# Patient Record
Sex: FEMALE | Race: WHITE | Employment: UNEMPLOYED | URBAN - METROPOLITAN AREA
[De-identification: names, ages, dates, MRNs, and addresses within clinical notes are randomized per-mention and may not be internally consistent; named-entity substitution may affect disease eponyms.]

---

## 2024-01-25 ENCOUNTER — INITIAL CONSULT (OUTPATIENT)
Dept: PHYSICAL MEDICINE AND REHAB | Age: 64
End: 2024-01-25
Payer: COMMERCIAL

## 2024-01-25 VITALS — DIASTOLIC BLOOD PRESSURE: 70 MMHG | SYSTOLIC BLOOD PRESSURE: 120 MMHG | HEART RATE: 97 BPM | TEMPERATURE: 97.6 F

## 2024-01-25 DIAGNOSIS — M79.7 FIBROMYALGIA: ICD-10-CM

## 2024-01-25 DIAGNOSIS — R25.2 SPASTICITY AS LATE EFFECT OF CEREBROVASCULAR ACCIDENT (CVA): ICD-10-CM

## 2024-01-25 DIAGNOSIS — I69.320 APHASIA AS LATE EFFECT OF CEREBROVASCULAR ACCIDENT (CVA): ICD-10-CM

## 2024-01-25 DIAGNOSIS — I69.398 SPASTICITY AS LATE EFFECT OF CEREBROVASCULAR ACCIDENT (CVA): ICD-10-CM

## 2024-01-25 DIAGNOSIS — I69.351 HEMIPLEGIA AND HEMIPARESIS FOLLOWING CEREBRAL INFARCTION AFFECTING RIGHT DOMINANT SIDE (HCC): Primary | ICD-10-CM

## 2024-01-25 PROCEDURE — 99205 OFFICE O/P NEW HI 60 MIN: CPT | Performed by: PHYSICAL MEDICINE & REHABILITATION

## 2024-01-25 RX ORDER — ESCITALOPRAM OXALATE 10 MG/1
10 TABLET ORAL DAILY
COMMUNITY
End: 2024-01-25 | Stop reason: ALTCHOICE

## 2024-01-25 RX ORDER — TRIAMCINOLONE ACETONIDE 1 MG/G
CREAM TOPICAL
COMMUNITY

## 2024-01-25 RX ORDER — ASPIRIN 81 MG/1
TABLET, CHEWABLE ORAL
COMMUNITY
Start: 2022-03-05

## 2024-01-25 RX ORDER — ATORVASTATIN CALCIUM 20 MG/1
20 TABLET, FILM COATED ORAL
COMMUNITY

## 2024-01-25 RX ORDER — OMEPRAZOLE 20 MG/1
20 CAPSULE, DELAYED RELEASE ORAL DAILY
COMMUNITY
End: 2024-01-25

## 2024-01-25 RX ORDER — LISINOPRIL 5 MG/1
5 TABLET ORAL DAILY
COMMUNITY
End: 2024-01-25

## 2024-01-25 RX ORDER — ESCITALOPRAM OXALATE 10 MG/1
TABLET ORAL
COMMUNITY
Start: 2022-03-05 | End: 2024-01-25

## 2024-01-25 RX ORDER — GABAPENTIN 300 MG/1
600 CAPSULE ORAL 3 TIMES DAILY
COMMUNITY

## 2024-01-25 RX ORDER — OMEPRAZOLE 20 MG/1
20 TABLET, DELAYED RELEASE ORAL DAILY
COMMUNITY

## 2024-01-25 RX ORDER — DULOXETIN HYDROCHLORIDE 30 MG/1
30 CAPSULE, DELAYED RELEASE ORAL DAILY
Qty: 30 CAPSULE | Refills: 3 | Status: SHIPPED | OUTPATIENT
Start: 2024-01-25

## 2024-01-25 RX ORDER — FLUTICASONE PROPIONATE 50 MCG
1 SPRAY, SUSPENSION (ML) NASAL DAILY
COMMUNITY
Start: 2022-08-21

## 2024-01-25 RX ORDER — BACLOFEN 10 MG/1
15 TABLET ORAL 3 TIMES DAILY
Qty: 135 TABLET | Refills: 2 | Status: SHIPPED | OUTPATIENT
Start: 2024-01-25

## 2024-01-25 RX ORDER — BACLOFEN 10 MG/1
10 TABLET ORAL 3 TIMES DAILY
COMMUNITY
End: 2024-01-25 | Stop reason: SDUPTHER

## 2024-01-25 RX ORDER — LISINOPRIL 5 MG/1
TABLET ORAL
COMMUNITY
Start: 2022-03-05 | End: 2024-01-25

## 2024-01-25 RX ORDER — ACETAMINOPHEN 500 MG
500-1000 TABLET ORAL EVERY 6 HOURS PRN
COMMUNITY

## 2024-01-25 NOTE — PROGRESS NOTES
[facility], [facility] PHYSICAL MEDICINE AND REHABILITATION  [address] [place] [address]  [place] [address]  Dept: [phone]  Dept Fax: [phone]    New Patient ConsultationNote    Allie Chan, 63 y.o., female, is c/o of Pain (Treat pain due to stroke)   and request for evaluation of R hemiparesis after CVA by Dr. Cornelio Hernández.     HPI:     HPI  Patient with history of R dominant hemiparesis secondary to stroke which occurred 1/21/2022 due to tandem L MCA and ICA occlusion. She has subsequent spasticity and expressive aphasia.     Neurology note from 1/9/24 is reviewed today. Hypercoagulable workup was unremarkable. She was also seen by Dr. Hernández in neurosurgery and follows with pain management.     She is accompanied by her  who notes that the spasticity has been treated in the past with Botox performed by neurology at [facility]. Dr. Vasquez treated the R brachialis, R FDS and R FDP in February 2023. She and her  do not recall how effective the treatment was as they did not return for follow up or repeat procedure.     Stroke    Affected Side: right dominant  Handedness: right handed  Therapy Status: PT/OT completed December 2022, Speech completed June 2023  Dysphagia: Absent   Aphasia: Mixed Transcortical - she has impaired comprehension and fluency but is able to repeat  Sleep:WNL  Daytime Focus/Attention:Intact  Bowel: Spontaneous  : Spontaneous  Mood: Depressed and Anxious  Support: spouse / significant other and children / family / friends to help, friend [place]  Spasticity: Present and being treated with oral medications. Last Botox February 2023  Edema: right arm and right leg  DME: Orthosis AFO Right and Resting Hand Splint, Hemiwalker, and Manual Wheelchair  Shoulder Pain: Intermittent right - unable to characterize or quantify the pain due to aphasia    No past medical history on file.   No past surgical history on file.  No family history on

## 2024-01-29 ENCOUNTER — TELEPHONE (OUTPATIENT)
Dept: PHYSICAL MEDICINE AND REHAB | Age: 64
End: 2024-01-29

## 2024-01-29 NOTE — TELEPHONE ENCOUNTER
Pt's  called in stating that they tried taking the Baclofen 10 mg 1 1/2 tabs TID and the patient had dizziness, fatigue and was just not herself. He dropped back down the next day and things did get better. I did explain that she could try increasing slower and just try one dose at a time but he said she is not interested in increasing it again. She is doing fine with just the 10 mg dose of the Baclofen. Please advise.

## 2024-01-30 NOTE — TELEPHONE ENCOUNTER
Spoke with pt's  to let him know. He is getting ready to start her on the Cymbalta and will let us know how things go. He may try the Baclofen 15 mg at HS at a later date if needed.